# Patient Record
Sex: MALE | Race: WHITE | NOT HISPANIC OR LATINO | URBAN - METROPOLITAN AREA
[De-identification: names, ages, dates, MRNs, and addresses within clinical notes are randomized per-mention and may not be internally consistent; named-entity substitution may affect disease eponyms.]

---

## 2023-01-23 ENCOUNTER — AMBULATORY SURGICAL CENTER (OUTPATIENT)
Dept: URBAN - METROPOLITAN AREA SURGERY 4 | Facility: SURGERY | Age: 63
End: 2023-01-23

## 2023-01-24 ENCOUNTER — OFFICE (OUTPATIENT)
Dept: URBAN - METROPOLITAN AREA CLINIC 13 | Facility: CLINIC | Age: 63
End: 2023-01-24
Payer: MEDICAID

## 2023-01-24 VITALS
SYSTOLIC BLOOD PRESSURE: 150 MMHG | OXYGEN SATURATION: 93 % | OXYGEN SATURATION: 93 % | HEIGHT: 69 IN | OXYGEN SATURATION: 93 % | HEART RATE: 101 BPM | HEART RATE: 101 BPM | HEIGHT: 69 IN | WEIGHT: 283 LBS | WEIGHT: 283 LBS | SYSTOLIC BLOOD PRESSURE: 150 MMHG | DIASTOLIC BLOOD PRESSURE: 88 MMHG | DIASTOLIC BLOOD PRESSURE: 88 MMHG | HEIGHT: 69 IN | SYSTOLIC BLOOD PRESSURE: 150 MMHG | WEIGHT: 283 LBS | DIASTOLIC BLOOD PRESSURE: 88 MMHG | HEART RATE: 101 BPM

## 2023-01-24 DIAGNOSIS — B18.2 CHRONIC VIRAL HEPATITIS C: ICD-10-CM

## 2023-01-24 DIAGNOSIS — F20.9 SCHIZOPHRENIA, UNSPECIFIED: ICD-10-CM

## 2023-01-24 DIAGNOSIS — R93.3 ABNORMAL FINDINGS ON DIAGNOSTIC IMAGING OF OTHER PARTS OF DI: ICD-10-CM

## 2023-01-24 DIAGNOSIS — E66.01 MORBID (SEVERE) OBESITY DUE TO EXCESS CALORIES: ICD-10-CM

## 2023-01-24 PROCEDURE — 99214 OFFICE O/P EST MOD 30 MIN: CPT | Performed by: INTERNAL MEDICINE

## 2023-01-24 PROCEDURE — 99244 OFF/OP CNSLTJ NEW/EST MOD 40: CPT | Performed by: INTERNAL MEDICINE

## 2023-01-31 LAB
AFP - TUMOR MARKER: 6.4 NG/ML — HIGH
CBC  AND  PLATELET COUNT: HEMATOCRIT: 45.8 %
CBC  AND  PLATELET COUNT: HEMOGLOBIN: 16 G/DL
CBC  AND  PLATELET COUNT: MCH: 31.3 PG
CBC  AND  PLATELET COUNT: MCHC: 34.9 G/DL
CBC  AND  PLATELET COUNT: MCV: 89.5 FL
CBC  AND  PLATELET COUNT: MPV: 12.1 FL — HIGH
CBC  AND  PLATELET COUNT: PLATELET COUNT: 140 K/UL
CBC  AND  PLATELET COUNT: RBC: 5.12 M/UL
CBC  AND  PLATELET COUNT: RDW: 12.8 %
CBC  AND  PLATELET COUNT: WBC COUNT: 5.8 K/UL
COMPREHENSIVE METABOLIC PANEL: A/G RATIO: 1.7 RATIO
COMPREHENSIVE METABOLIC PANEL: ALBUMIN: 3.8 G/DL
COMPREHENSIVE METABOLIC PANEL: ALK PHOSPHATASE: 108 U/L
COMPREHENSIVE METABOLIC PANEL: ALT: 69 U/L — HIGH
COMPREHENSIVE METABOLIC PANEL: AST: 43 U/L
COMPREHENSIVE METABOLIC PANEL: BILIRUBIN,TOTAL: 0.4 MG/DL
COMPREHENSIVE METABOLIC PANEL: BLOOD UREA NITROGEN: 13 MG/DL
COMPREHENSIVE METABOLIC PANEL: BUN/CREAT RATIO: 16
COMPREHENSIVE METABOLIC PANEL: CALCIUM: 8.9 MG/DL
COMPREHENSIVE METABOLIC PANEL: CHLORIDE: 104 MEQ/L
COMPREHENSIVE METABOLIC PANEL: CO2: 28 MEQ/L
COMPREHENSIVE METABOLIC PANEL: CREATININE: 0.8 MG/DL
COMPREHENSIVE METABOLIC PANEL: FASTING STATUS: (no result)
COMPREHENSIVE METABOLIC PANEL: GLOBULIN: 2.2 G/DL
COMPREHENSIVE METABOLIC PANEL: GLOMERULAR FILTRATION RATE (GFR): 99 MLS/MIN/1.73M2
COMPREHENSIVE METABOLIC PANEL: GLUCOSE,RANDOM: 154 MG/DL — HIGH
COMPREHENSIVE METABOLIC PANEL: POTASSIUM: 4 MEQ/L
COMPREHENSIVE METABOLIC PANEL: SODIUM: 140 MEQ/L
COMPREHENSIVE METABOLIC PANEL: TOTAL PROTEIN: 6 G/DL
HCV GENOTYPE: ABNORMAL
HCV VIRAL RNA,PCR W/REFLEX TO GENOTYPE: HCV RNA, PCR QN LOG: 6.41 LOG IU/ML — HIGH
HCV VIRAL RNA,PCR W/REFLEX TO GENOTYPE: HCV RNA, PCR, QUAL: DETECTED
HCV VIRAL RNA,PCR W/REFLEX TO GENOTYPE: HCV VIRAL RNA, PCR W/REFLEX: HIGH IU/ML
HEP B SURFACE AB QN: 0.8 MIU/ML
HEP B SURFACE AG W/CONFIRMATION: NEGATIVE
HEPATITIS A IGG AB: POSITIVE
LIVER FIBROSIS, FIBROTEST ACTITEST PANEL: ALPHA 2 MACROGLOBULIN: 364 MG/DL — HIGH
LIVER FIBROSIS, FIBROTEST ACTITEST PANEL: ALT: 64 U/L — HIGH
LIVER FIBROSIS, FIBROTEST ACTITEST PANEL: APOLIPOPROTEIN A1: 160 MG/DL
LIVER FIBROSIS, FIBROTEST ACTITEST PANEL: FIBROSIS INTERPRETATION: (no result)
LIVER FIBROSIS, FIBROTEST ACTITEST PANEL: FIBROSIS SCORE: 0.55
LIVER FIBROSIS, FIBROTEST ACTITEST PANEL: FIBROSIS STAGE: (no result)
LIVER FIBROSIS, FIBROTEST ACTITEST PANEL: FOOTNOTE: (no result)
LIVER FIBROSIS, FIBROTEST ACTITEST PANEL: GGT: 27 U/L
LIVER FIBROSIS, FIBROTEST ACTITEST PANEL: HAPTOGLOBIN: 108 MG/DL
LIVER FIBROSIS, FIBROTEST ACTITEST PANEL: NECROINFLAMMAT ACT GRADE: (no result)
LIVER FIBROSIS, FIBROTEST ACTITEST PANEL: NECROINFLAMMAT ACT SCORE: 0.47
LIVER FIBROSIS, FIBROTEST ACTITEST PANEL: NECROINFLAMMAT INTERP: (no result)
LIVER FIBROSIS, FIBROTEST ACTITEST PANEL: REFERENCE ID: (no result)
LIVER FIBROSIS, FIBROTEST ACTITEST PANEL: TOTAL BILIRUBIN: 0.5 MG/DL
PROTIME: INR: 1
PROTIME: PT: 12.6 SEC

## 2023-02-02 ENCOUNTER — OFFICE (OUTPATIENT)
Dept: URBAN - METROPOLITAN AREA CLINIC 18 | Facility: CLINIC | Age: 63
End: 2023-02-02
Payer: MEDICAID

## 2023-02-02 VITALS — HEIGHT: 69 IN

## 2023-02-02 DIAGNOSIS — B18.2 CHRONIC VIRAL HEPATITIS C: ICD-10-CM

## 2023-02-02 PROCEDURE — 91200 LIVER ELASTOGRAPHY: CPT | Performed by: INTERNAL MEDICINE

## 2023-03-13 ENCOUNTER — OFFICE (OUTPATIENT)
Dept: URBAN - METROPOLITAN AREA PATHOLOGY 1 | Facility: PATHOLOGY | Age: 63
End: 2023-03-13
Payer: MEDICAID

## 2023-03-13 ENCOUNTER — AMBULATORY SURGICAL CENTER (OUTPATIENT)
Dept: URBAN - METROPOLITAN AREA SURGERY 4 | Facility: SURGERY | Age: 63
End: 2023-03-13
Payer: MEDICAID

## 2023-03-13 VITALS
RESPIRATION RATE: 16 BRPM | DIASTOLIC BLOOD PRESSURE: 76 MMHG | WEIGHT: 284 LBS | RESPIRATION RATE: 27 BRPM | RESPIRATION RATE: 17 BRPM | SYSTOLIC BLOOD PRESSURE: 92 MMHG | DIASTOLIC BLOOD PRESSURE: 68 MMHG | DIASTOLIC BLOOD PRESSURE: 84 MMHG | HEART RATE: 88 BPM | HEART RATE: 87 BPM | SYSTOLIC BLOOD PRESSURE: 114 MMHG | SYSTOLIC BLOOD PRESSURE: 89 MMHG | HEART RATE: 79 BPM | OXYGEN SATURATION: 92 % | DIASTOLIC BLOOD PRESSURE: 70 MMHG | RESPIRATION RATE: 17 BRPM | HEART RATE: 89 BPM | DIASTOLIC BLOOD PRESSURE: 63 MMHG | SYSTOLIC BLOOD PRESSURE: 94 MMHG | SYSTOLIC BLOOD PRESSURE: 94 MMHG | SYSTOLIC BLOOD PRESSURE: 131 MMHG | OXYGEN SATURATION: 91 % | OXYGEN SATURATION: 96 % | HEART RATE: 80 BPM | SYSTOLIC BLOOD PRESSURE: 90 MMHG | DIASTOLIC BLOOD PRESSURE: 75 MMHG | RESPIRATION RATE: 16 BRPM | SYSTOLIC BLOOD PRESSURE: 92 MMHG | DIASTOLIC BLOOD PRESSURE: 71 MMHG | WEIGHT: 284 LBS | HEART RATE: 76 BPM | SYSTOLIC BLOOD PRESSURE: 97 MMHG | DIASTOLIC BLOOD PRESSURE: 62 MMHG | DIASTOLIC BLOOD PRESSURE: 71 MMHG | RESPIRATION RATE: 18 BRPM | DIASTOLIC BLOOD PRESSURE: 67 MMHG | RESPIRATION RATE: 20 BRPM | SYSTOLIC BLOOD PRESSURE: 90 MMHG | DIASTOLIC BLOOD PRESSURE: 64 MMHG | SYSTOLIC BLOOD PRESSURE: 91 MMHG | DIASTOLIC BLOOD PRESSURE: 76 MMHG | HEART RATE: 82 BPM | SYSTOLIC BLOOD PRESSURE: 104 MMHG | RESPIRATION RATE: 27 BRPM | TEMPERATURE: 97.2 F | SYSTOLIC BLOOD PRESSURE: 132 MMHG | SYSTOLIC BLOOD PRESSURE: 100 MMHG | SYSTOLIC BLOOD PRESSURE: 104 MMHG | DIASTOLIC BLOOD PRESSURE: 91 MMHG | DIASTOLIC BLOOD PRESSURE: 68 MMHG | DIASTOLIC BLOOD PRESSURE: 63 MMHG | OXYGEN SATURATION: 95 % | HEART RATE: 75 BPM | RESPIRATION RATE: 12 BRPM | HEART RATE: 79 BPM | SYSTOLIC BLOOD PRESSURE: 89 MMHG | HEART RATE: 94 BPM | RESPIRATION RATE: 18 BRPM | SYSTOLIC BLOOD PRESSURE: 100 MMHG | SYSTOLIC BLOOD PRESSURE: 132 MMHG | SYSTOLIC BLOOD PRESSURE: 103 MMHG | SYSTOLIC BLOOD PRESSURE: 103 MMHG | RESPIRATION RATE: 20 BRPM | DIASTOLIC BLOOD PRESSURE: 70 MMHG | SYSTOLIC BLOOD PRESSURE: 91 MMHG | OXYGEN SATURATION: 95 % | HEART RATE: 80 BPM | HEART RATE: 94 BPM | HEART RATE: 75 BPM | OXYGEN SATURATION: 96 % | SYSTOLIC BLOOD PRESSURE: 137 MMHG | SYSTOLIC BLOOD PRESSURE: 131 MMHG | HEART RATE: 83 BPM | RESPIRATION RATE: 12 BRPM | OXYGEN SATURATION: 90 % | HEART RATE: 88 BPM | HEART RATE: 82 BPM | HEIGHT: 69 IN | OXYGEN SATURATION: 90 % | HEART RATE: 87 BPM | SYSTOLIC BLOOD PRESSURE: 97 MMHG | HEART RATE: 86 BPM | TEMPERATURE: 97.2 F | HEIGHT: 69 IN | OXYGEN SATURATION: 91 % | DIASTOLIC BLOOD PRESSURE: 75 MMHG | HEART RATE: 76 BPM | HEART RATE: 83 BPM | DIASTOLIC BLOOD PRESSURE: 62 MMHG | DIASTOLIC BLOOD PRESSURE: 91 MMHG | DIASTOLIC BLOOD PRESSURE: 64 MMHG | SYSTOLIC BLOOD PRESSURE: 137 MMHG | SYSTOLIC BLOOD PRESSURE: 114 MMHG | DIASTOLIC BLOOD PRESSURE: 84 MMHG | OXYGEN SATURATION: 92 % | HEART RATE: 86 BPM | HEART RATE: 89 BPM | DIASTOLIC BLOOD PRESSURE: 67 MMHG

## 2023-03-13 DIAGNOSIS — D12.3 BENIGN NEOPLASM OF TRANSVERSE COLON: ICD-10-CM

## 2023-03-13 DIAGNOSIS — K25.9 GASTRIC ULCER, UNSPECIFIED AS ACUTE OR CHRONIC, WITHOUT HEMO: ICD-10-CM

## 2023-03-13 DIAGNOSIS — K62.1 RECTAL POLYP: ICD-10-CM

## 2023-03-13 DIAGNOSIS — D12.2 BENIGN NEOPLASM OF ASCENDING COLON: ICD-10-CM

## 2023-03-13 DIAGNOSIS — Z12.11 ENCOUNTER FOR SCREENING FOR MALIGNANT NEOPLASM OF COLON: ICD-10-CM

## 2023-03-13 DIAGNOSIS — K31.89 OTHER DISEASES OF STOMACH AND DUODENUM: ICD-10-CM

## 2023-03-13 DIAGNOSIS — D17.79 BENIGN LIPOMATOUS NEOPLASM OF OTHER SITES: ICD-10-CM

## 2023-03-13 DIAGNOSIS — Z80.0 FAMILY HISTORY OF MALIGNANT NEOPLASM OF DIGESTIVE ORGANS: ICD-10-CM

## 2023-03-13 DIAGNOSIS — K74.60 UNSPECIFIED CIRRHOSIS OF LIVER: ICD-10-CM

## 2023-03-13 DIAGNOSIS — K64.4 RESIDUAL HEMORRHOIDAL SKIN TAGS: ICD-10-CM

## 2023-03-13 PROBLEM — K63.5 POLYP OF COLON: Status: ACTIVE | Noted: 2023-03-13

## 2023-03-13 PROCEDURE — 88312 SPECIAL STAINS GROUP 1: CPT | Performed by: PATHOLOGY

## 2023-03-13 PROCEDURE — 43239 EGD BIOPSY SINGLE/MULTIPLE: CPT | Performed by: INTERNAL MEDICINE

## 2023-03-13 PROCEDURE — 45385 COLONOSCOPY W/LESION REMOVAL: CPT | Mod: 33 | Performed by: INTERNAL MEDICINE

## 2023-03-13 PROCEDURE — 88305 TISSUE EXAM BY PATHOLOGIST: CPT | Performed by: PATHOLOGY

## 2023-03-13 NOTE — SERVICEHPINOTES
Active hepatitis C, cirrhosis by FibroScan confirmed by recent MRI, family history of colon cancer in his brother less than age 60.

## 2023-03-16 LAB
PDF: PDF REPORT: (no result)
PDF: PDF REPORT: (no result)

## 2023-04-07 ENCOUNTER — OFFICE (OUTPATIENT)
Dept: URBAN - METROPOLITAN AREA CLINIC 13 | Facility: CLINIC | Age: 63
End: 2023-04-07
Payer: MEDICAID

## 2023-04-07 VITALS
HEIGHT: 69 IN | DIASTOLIC BLOOD PRESSURE: 90 MMHG | WEIGHT: 287 LBS | SYSTOLIC BLOOD PRESSURE: 158 MMHG | HEART RATE: 93 BPM

## 2023-04-07 DIAGNOSIS — B18.2 CHRONIC VIRAL HEPATITIS C: ICD-10-CM

## 2023-04-07 DIAGNOSIS — K74.60 UNSPECIFIED CIRRHOSIS OF LIVER: ICD-10-CM

## 2023-04-07 PROCEDURE — 99213 OFFICE O/P EST LOW 20 MIN: CPT | Mod: SA | Performed by: NURSE PRACTITIONER

## 2023-04-07 RX ORDER — VELPATASVIR AND SOFOSBUVIR 100; 400 MG/1; MG/1
400 TABLET, FILM COATED ORAL
Qty: 28 | Refills: 2 | Status: ACTIVE
Start: 2023-04-07

## 2023-05-12 ENCOUNTER — OFFICE (OUTPATIENT)
Dept: URBAN - METROPOLITAN AREA CLINIC 13 | Facility: CLINIC | Age: 63
End: 2023-05-12
Payer: MEDICAID

## 2023-05-12 VITALS
WEIGHT: 284 LBS | HEART RATE: 79 BPM | DIASTOLIC BLOOD PRESSURE: 84 MMHG | HEIGHT: 69 IN | SYSTOLIC BLOOD PRESSURE: 134 MMHG

## 2023-05-12 DIAGNOSIS — B18.2 CHRONIC VIRAL HEPATITIS C: ICD-10-CM

## 2023-05-12 LAB — AFP - TUMOR MARKER: 6.4 NG/ML — HIGH

## 2023-05-12 PROCEDURE — 99212 OFFICE O/P EST SF 10 MIN: CPT | Mod: SA | Performed by: NURSE PRACTITIONER

## 2023-05-16 LAB
CBC, PLATELET CT  AND  DIFF: ABS BASOPHIL: 0 K/UL
CBC, PLATELET CT  AND  DIFF: ABS EOSINOPHIL: 0.1 K/UL
CBC, PLATELET CT  AND  DIFF: ABS IMMATURE GRANS: 0 K/UL
CBC, PLATELET CT  AND  DIFF: ABS LYMPHOCYTE: 2.4 K/UL
CBC, PLATELET CT  AND  DIFF: ABS MONOCYTE: 0.5 K/UL
CBC, PLATELET CT  AND  DIFF: ABS NEUTROPHIL: 3.2 K/UL
CBC, PLATELET CT  AND  DIFF: BASOPHIL: 0.5 %
CBC, PLATELET CT  AND  DIFF: DIFFERENTIAL: (no result)
CBC, PLATELET CT  AND  DIFF: EOSINOPHIL: 1.6 %
CBC, PLATELET CT  AND  DIFF: HEMATOCRIT: 46.4 %
CBC, PLATELET CT  AND  DIFF: HEMOGLOBIN: 15.7 G/DL
CBC, PLATELET CT  AND  DIFF: IMMATURE GRANULOCYTES: 0.2 %
CBC, PLATELET CT  AND  DIFF: LYMPHOCYTE: 38.3 %
CBC, PLATELET CT  AND  DIFF: MCH: 30.7 PG
CBC, PLATELET CT  AND  DIFF: MCHC: 33.8 G/DL
CBC, PLATELET CT  AND  DIFF: MCV: 90.8 FL
CBC, PLATELET CT  AND  DIFF: MONOCYTE: 8.2 %
CBC, PLATELET CT  AND  DIFF: MPV: 12 FL — HIGH
CBC, PLATELET CT  AND  DIFF: NEUTROPHIL: 51.2 %
CBC, PLATELET CT  AND  DIFF: NRBCS: 0 /100 WBC
CBC, PLATELET CT  AND  DIFF: PLATELET COUNT: 146 K/UL
CBC, PLATELET CT  AND  DIFF: RBC: 5.11 M/UL
CBC, PLATELET CT  AND  DIFF: RDW: 12.3 %
CBC, PLATELET CT  AND  DIFF: WBC COUNT: 6.2 K/UL
HCV VIRAL RNA, PCR: HCV RNA, PCR QN LOG: <1.18 LOG IU/ML
HCV VIRAL RNA, PCR: HCV RNA, PCR QN VIRAL LOAD: <15 IU/ML
HCV VIRAL RNA, PCR: HCV RNA, PCR, QUAL: DETECTED
HEPATIC FUNCTION PANEL: A/G RATIO: 1.8 RATIO
HEPATIC FUNCTION PANEL: ALBUMIN: 4.1 G/DL
HEPATIC FUNCTION PANEL: ALK PHOSPHATASE: 98 U/L
HEPATIC FUNCTION PANEL: ALT: 19 U/L
HEPATIC FUNCTION PANEL: AST: 17 U/L
HEPATIC FUNCTION PANEL: BILIRUBIN, INDIRECT: (no result) MG/DL
HEPATIC FUNCTION PANEL: BILIRUBIN,DIRECT: <0.2 MG/DL
HEPATIC FUNCTION PANEL: BILIRUBIN,TOTAL: 0.4 MG/DL
HEPATIC FUNCTION PANEL: GLOBULIN: 2.3 G/DL
HEPATIC FUNCTION PANEL: TOTAL PROTEIN: 6.4 G/DL

## 2025-06-05 ENCOUNTER — OFFICE (OUTPATIENT)
Dept: URBAN - METROPOLITAN AREA CLINIC 13 | Facility: CLINIC | Age: 65
End: 2025-06-05
Payer: MEDICAID

## 2025-06-05 VITALS
WEIGHT: 275 LBS | HEART RATE: 72 BPM | SYSTOLIC BLOOD PRESSURE: 140 MMHG | DIASTOLIC BLOOD PRESSURE: 70 MMHG | HEIGHT: 69 IN

## 2025-06-05 DIAGNOSIS — K74.60 UNSPECIFIED CIRRHOSIS OF LIVER: ICD-10-CM

## 2025-06-05 DIAGNOSIS — E66.9 OBESITY, UNSPECIFIED: ICD-10-CM

## 2025-06-05 LAB
ALPHA-FETOPROTEIN (AFP), TUMOR MARKER: 3.9 NG/ML
COMPREHENSIVE METABOLIC PANEL: A/G RATIO: 1.6 RATIO
COMPREHENSIVE METABOLIC PANEL: ALBUMIN: 4.4 G/DL
COMPREHENSIVE METABOLIC PANEL: ALK PHOSPHATASE: 87 U/L
COMPREHENSIVE METABOLIC PANEL: ALT: 30 U/L
COMPREHENSIVE METABOLIC PANEL: AST: 23 U/L
COMPREHENSIVE METABOLIC PANEL: BILIRUBIN,TOTAL: 0.5 MG/DL
COMPREHENSIVE METABOLIC PANEL: BLOOD UREA NITROGEN: 10 MG/DL
COMPREHENSIVE METABOLIC PANEL: BUN/CREAT RATIO: 13
COMPREHENSIVE METABOLIC PANEL: CALCIUM: 10.1 MG/DL
COMPREHENSIVE METABOLIC PANEL: CHLORIDE: 105 MEQ/L
COMPREHENSIVE METABOLIC PANEL: CO2: 26 MEQ/L
COMPREHENSIVE METABOLIC PANEL: CREATININE: 0.8 MG/DL
COMPREHENSIVE METABOLIC PANEL: FASTING STATUS: (no result)
COMPREHENSIVE METABOLIC PANEL: GLOBULIN: 2.7 G/DL
COMPREHENSIVE METABOLIC PANEL: GLOMERULAR FILTRATION RATE (GFR): 98 MLS/MIN/1.73M2
COMPREHENSIVE METABOLIC PANEL: GLUCOSE,RANDOM: 105 MG/DL — HIGH
COMPREHENSIVE METABOLIC PANEL: POTASSIUM: 4.3 MEQ/L
COMPREHENSIVE METABOLIC PANEL: SODIUM: 144 MEQ/L
COMPREHENSIVE METABOLIC PANEL: TOTAL PROTEIN: 7.1 G/DL
PROTIME: INR: 1.1
PROTIME: PT: 14 SEC — HIGH

## 2025-06-05 PROCEDURE — 99214 OFFICE O/P EST MOD 30 MIN: CPT | Performed by: INTERNAL MEDICINE
